# Patient Record
Sex: FEMALE | Race: WHITE | NOT HISPANIC OR LATINO | Employment: FULL TIME | URBAN - METROPOLITAN AREA
[De-identification: names, ages, dates, MRNs, and addresses within clinical notes are randomized per-mention and may not be internally consistent; named-entity substitution may affect disease eponyms.]

---

## 2020-03-22 ENCOUNTER — APPOINTMENT (OUTPATIENT)
Dept: CT IMAGING | Facility: CLINIC | Age: 30
End: 2020-03-22
Attending: EMERGENCY MEDICINE
Payer: COMMERCIAL

## 2020-03-22 ENCOUNTER — HOSPITAL ENCOUNTER (EMERGENCY)
Facility: CLINIC | Age: 30
Discharge: HOME | End: 2020-03-22
Attending: EMERGENCY MEDICINE | Admitting: EMERGENCY MEDICINE
Payer: COMMERCIAL

## 2020-03-22 VITALS
OXYGEN SATURATION: 98 % | HEIGHT: 68 IN | TEMPERATURE: 98 F | RESPIRATION RATE: 20 BRPM | HEART RATE: 68 BPM | WEIGHT: 195 LBS | SYSTOLIC BLOOD PRESSURE: 127 MMHG | DIASTOLIC BLOOD PRESSURE: 80 MMHG | BODY MASS INDEX: 29.55 KG/M2

## 2020-03-22 DIAGNOSIS — N20.1 RIGHT URETERAL STONE: ICD-10-CM

## 2020-03-22 DIAGNOSIS — N13.4 HYDROURETER ON RIGHT: ICD-10-CM

## 2020-03-22 LAB
ALBUMIN UR-MCNC: NEGATIVE MG/DL
ANION GAP SERPL CALCULATED.3IONS-SCNC: 7 MMOL/L (ref 3–14)
APPEARANCE UR: CLEAR
BACTERIA #/AREA URNS HPF: ABNORMAL /HPF
BASOPHILS # BLD AUTO: 0.1 10E9/L (ref 0–0.2)
BASOPHILS NFR BLD AUTO: 0.3 %
BILIRUB UR QL STRIP: NEGATIVE
BUN SERPL-MCNC: 18 MG/DL (ref 7–30)
CALCIUM SERPL-MCNC: 10 MG/DL (ref 8.5–10.1)
CHLORIDE SERPL-SCNC: 105 MMOL/L (ref 94–109)
CO2 SERPL-SCNC: 25 MMOL/L (ref 20–32)
COLOR UR AUTO: ABNORMAL
CREAT SERPL-MCNC: 0.77 MG/DL (ref 0.52–1.04)
DIFFERENTIAL METHOD BLD: ABNORMAL
EOSINOPHIL # BLD AUTO: 0 10E9/L (ref 0–0.7)
EOSINOPHIL NFR BLD AUTO: 0.1 %
ERYTHROCYTE [DISTWIDTH] IN BLOOD BY AUTOMATED COUNT: 11.5 % (ref 10–15)
GFR SERPL CREATININE-BSD FRML MDRD: >90 ML/MIN/{1.73_M2}
GLUCOSE SERPL-MCNC: 102 MG/DL (ref 70–99)
GLUCOSE UR STRIP-MCNC: NEGATIVE MG/DL
HCG SERPL QL: NEGATIVE
HCT VFR BLD AUTO: 42.7 % (ref 35–47)
HGB BLD-MCNC: 14.8 G/DL (ref 11.7–15.7)
HGB UR QL STRIP: ABNORMAL
IMM GRANULOCYTES # BLD: 0.1 10E9/L (ref 0–0.4)
IMM GRANULOCYTES NFR BLD: 0.3 %
KETONES UR STRIP-MCNC: 5 MG/DL
LEUKOCYTE ESTERASE UR QL STRIP: NEGATIVE
LYMPHOCYTES # BLD AUTO: 1.2 10E9/L (ref 0.8–5.3)
LYMPHOCYTES NFR BLD AUTO: 7.5 %
MCH RBC QN AUTO: 31.1 PG (ref 26.5–33)
MCHC RBC AUTO-ENTMCNC: 34.7 G/DL (ref 31.5–36.5)
MCV RBC AUTO: 90 FL (ref 78–100)
MONOCYTES # BLD AUTO: 0.6 10E9/L (ref 0–1.3)
MONOCYTES NFR BLD AUTO: 4 %
MUCOUS THREADS #/AREA URNS LPF: PRESENT /LPF
NEUTROPHILS # BLD AUTO: 13.6 10E9/L (ref 1.6–8.3)
NEUTROPHILS NFR BLD AUTO: 87.8 %
NITRATE UR QL: NEGATIVE
NRBC # BLD AUTO: 0 10*3/UL
NRBC BLD AUTO-RTO: 0 /100
PH UR STRIP: 6 PH (ref 5–7)
PLATELET # BLD AUTO: 378 10E9/L (ref 150–450)
POTASSIUM SERPL-SCNC: 4 MMOL/L (ref 3.4–5.3)
RBC # BLD AUTO: 4.76 10E12/L (ref 3.8–5.2)
RBC #/AREA URNS AUTO: 9 /HPF (ref 0–2)
SODIUM SERPL-SCNC: 137 MMOL/L (ref 133–144)
SOURCE: ABNORMAL
SP GR UR STRIP: 1 (ref 1–1.03)
SQUAMOUS #/AREA URNS AUTO: 1 /HPF (ref 0–1)
UROBILINOGEN UR STRIP-MCNC: 0 MG/DL (ref 0–2)
WBC # BLD AUTO: 15.4 10E9/L (ref 4–11)
WBC #/AREA URNS AUTO: 1 /HPF (ref 0–5)

## 2020-03-22 PROCEDURE — 74176 CT ABD & PELVIS W/O CONTRAST: CPT

## 2020-03-22 PROCEDURE — 25800030 ZZH RX IP 258 OP 636: Performed by: EMERGENCY MEDICINE

## 2020-03-22 PROCEDURE — 84703 CHORIONIC GONADOTROPIN ASSAY: CPT | Performed by: EMERGENCY MEDICINE

## 2020-03-22 PROCEDURE — 96376 TX/PRO/DX INJ SAME DRUG ADON: CPT | Performed by: EMERGENCY MEDICINE

## 2020-03-22 PROCEDURE — 25000132 ZZH RX MED GY IP 250 OP 250 PS 637: Performed by: EMERGENCY MEDICINE

## 2020-03-22 PROCEDURE — 96375 TX/PRO/DX INJ NEW DRUG ADDON: CPT | Performed by: EMERGENCY MEDICINE

## 2020-03-22 PROCEDURE — 85025 COMPLETE CBC W/AUTO DIFF WBC: CPT | Performed by: EMERGENCY MEDICINE

## 2020-03-22 PROCEDURE — 80048 BASIC METABOLIC PNL TOTAL CA: CPT | Performed by: EMERGENCY MEDICINE

## 2020-03-22 PROCEDURE — 25000128 H RX IP 250 OP 636: Performed by: EMERGENCY MEDICINE

## 2020-03-22 PROCEDURE — 96361 HYDRATE IV INFUSION ADD-ON: CPT | Performed by: EMERGENCY MEDICINE

## 2020-03-22 PROCEDURE — 96374 THER/PROPH/DIAG INJ IV PUSH: CPT | Performed by: EMERGENCY MEDICINE

## 2020-03-22 PROCEDURE — 81001 URINALYSIS AUTO W/SCOPE: CPT | Performed by: EMERGENCY MEDICINE

## 2020-03-22 PROCEDURE — 99285 EMERGENCY DEPT VISIT HI MDM: CPT | Mod: Z6 | Performed by: EMERGENCY MEDICINE

## 2020-03-22 PROCEDURE — 99285 EMERGENCY DEPT VISIT HI MDM: CPT | Mod: 25 | Performed by: EMERGENCY MEDICINE

## 2020-03-22 RX ORDER — OXYCODONE HYDROCHLORIDE 5 MG/1
5 TABLET ORAL EVERY 6 HOURS PRN
Status: DISCONTINUED | OUTPATIENT
Start: 2020-03-22 | End: 2020-03-22 | Stop reason: HOSPADM

## 2020-03-22 RX ORDER — OXYCODONE HYDROCHLORIDE 5 MG/1
10 TABLET ORAL ONCE
Status: COMPLETED | OUTPATIENT
Start: 2020-03-22 | End: 2020-03-22

## 2020-03-22 RX ORDER — OXYCODONE HYDROCHLORIDE 5 MG/1
5 TABLET ORAL EVERY 6 HOURS PRN
Qty: 2 TABLET | Refills: 0 | Status: SHIPPED | OUTPATIENT
Start: 2020-03-22 | End: 2024-09-19

## 2020-03-22 RX ORDER — KETOROLAC TROMETHAMINE 15 MG/ML
15 INJECTION, SOLUTION INTRAMUSCULAR; INTRAVENOUS ONCE
Status: COMPLETED | OUTPATIENT
Start: 2020-03-22 | End: 2020-03-22

## 2020-03-22 RX ORDER — ONDANSETRON 4 MG/1
4 TABLET, ORALLY DISINTEGRATING ORAL EVERY 8 HOURS PRN
Qty: 10 TABLET | Refills: 0 | Status: SHIPPED | OUTPATIENT
Start: 2020-03-22

## 2020-03-22 RX ORDER — OXYCODONE HYDROCHLORIDE 5 MG/1
5 TABLET ORAL EVERY 6 HOURS PRN
Qty: 8 TABLET | Refills: 0 | Status: SHIPPED | OUTPATIENT
Start: 2020-03-22 | End: 2024-09-19

## 2020-03-22 RX ORDER — HYDROMORPHONE HYDROCHLORIDE 1 MG/ML
0.5 INJECTION, SOLUTION INTRAMUSCULAR; INTRAVENOUS; SUBCUTANEOUS
Status: COMPLETED | OUTPATIENT
Start: 2020-03-22 | End: 2020-03-22

## 2020-03-22 RX ADMIN — KETOROLAC TROMETHAMINE 15 MG: 15 INJECTION, SOLUTION INTRAMUSCULAR; INTRAVENOUS at 01:01

## 2020-03-22 RX ADMIN — OXYCODONE HYDROCHLORIDE 10 MG: 5 TABLET ORAL at 02:19

## 2020-03-22 RX ADMIN — HYDROMORPHONE HYDROCHLORIDE 0.5 MG: 1 INJECTION, SOLUTION INTRAMUSCULAR; INTRAVENOUS; SUBCUTANEOUS at 01:02

## 2020-03-22 RX ADMIN — HYDROMORPHONE HYDROCHLORIDE 0.5 MG: 1 INJECTION, SOLUTION INTRAMUSCULAR; INTRAVENOUS; SUBCUTANEOUS at 01:18

## 2020-03-22 RX ADMIN — SODIUM CHLORIDE, POTASSIUM CHLORIDE, SODIUM LACTATE AND CALCIUM CHLORIDE 1000 ML: 600; 310; 30; 20 INJECTION, SOLUTION INTRAVENOUS at 01:02

## 2020-03-22 RX ADMIN — HYDROMORPHONE HYDROCHLORIDE 0.5 MG: 1 INJECTION, SOLUTION INTRAMUSCULAR; INTRAVENOUS; SUBCUTANEOUS at 01:38

## 2020-03-22 ASSESSMENT — MIFFLIN-ST. JEOR: SCORE: 1648.01

## 2020-03-22 NOTE — ED AVS SNAPSHOT
[facility] Emergency Department  [address] [address]  Phone:  [phone]  Fax:  [phone]                                    Sabiha Ramos   MRN: [id number]    Department:  [facility] Emergency Department   Date of Visit:  3/22/2020           After Visit Summary Signature Page    I have received my discharge instructions, and my questions have been answered. I have discussed any challenges I see with this plan with the nurse or doctor.    ..........................................................................................................................................  Patient/Patient Representative Signature      ..........................................................................................................................................  Patient Representative Print Name and Relationship to Patient    ..................................................               ................................................  Date                                   Time    ..........................................................................................................................................  Reviewed by Signature/Title    ...................................................              ..............................................  Date                                               Time          22EPIC Rev 08/18

## 2020-03-22 NOTE — DISCHARGE INSTRUCTIONS
Drink plenty fluids.  General emergency department for fevers, repeated vomiting, worsening symptoms, or other concerns.  Otherwise follow-up in clinic if not better in the next week.    Use ibuprofen 400 mg and acetaminophen 650 mg every 6 hours for your symptoms.  Use oxycodone as needed for pain that is not controlled by the prior two medications.    Oxycodone is an addictive opioid medication, please only take it when the pain is more than can be controlled by acetaminophen or ibuprofen alone. It will also make you lightheaded, nauseated, and constipated.  Do not drive, operate heavy machinery, or take care of young children while taking this medication. Do not mix it with other medications or drugs that will make you sleepy, such as alcohol.     Repeated studies have shown that the longer you use opioid pain medications, the longer it is until you return to normal function. It is our recommendation that you taper off opioids as quickly as possible with the goal of returning to normal function or near normal function. Long term use of opioids quickly results in growing tolerance to the medication (less of the benefits) and increased dependence (more of the bad side effects).     Pain is very difficult to treat and we can very rarely take away pain completely. If you are having difficulty with pain over several weeks after an injury, you may need to start different medications and therapies (such as physical therapy, graded exercise, massage, and acupuncture). Please talk about this with your regular doctor.     If you are interested in seeking free, confidential treatment referral and information service for individuals and families facing mental health and/or substance use disorders please call [phone]-[employer] ([phone])

## 2020-03-22 NOTE — ED NOTES
Pt arrives in severe pain of the rt flank area radiating to rt [place]  [place] for the last 4 hrs. She stats that she thinks it is a kidney stone as she has had them before but thi is the worst she has ever had, She states that she is unable to give a urine sample. She denies blood in urine but  stated possibly pink on bathroom tissue. She states she has felt like she had a fever but did not take a temp. She took a percocet prior to arrival. She has a Hx gastric surgery for weight loss.

## 2020-03-22 NOTE — ED PROVIDER NOTES
"  History     Chief Complaint   Patient presents with     Flank Pain     rt side  stareted 5 hrs ago/ migrated to bladder.     HPI  Sabiha Ramos is a 29 year old female who presents for right flank pain.  Symptoms started about 5 hours prior to arrival, pain is described as sharp and cramping, rated as severe, radiates to the suprapubic region.  She has nausea but no vomiting.  No diarrhea.  She denies dysuria or urinary frequency.  No vaginal bleeding or discharge.  No rash.  She is tried taking acetaminophen and oxycodone without improvement.  She has had a prior gastric bypass surgery.  She also has had prior kidney stones and says this feels similar but is much worse than before.  No fever, chills, difficulty breathing.    Allergies:  Allergies   Allergen Reactions     Augmentin      Cephalosporins      Diamox [Acetazolamide]      Penicillins        Problem List:    There are no active problems to display for this patient.       Past Medical History:    No past medical history on file.    Past Surgical History:    No past surgical history on file.    Family History:    No family history on file.    Social History:  Marital Status:    Social History     Tobacco Use     Smoking status: Not on file   Substance Use Topics     Alcohol use: Not on file     Drug use: Not on file        Medications:    ondansetron (ZOFRAN ODT) 4 MG ODT tab  oxyCODONE (ROXICODONE) 5 MG tablet  oxyCODONE (ROXICODONE) 5 MG tablet          Review of Systems  Pertinent positives and negatives listed in the HPI, all other systems reviewed and are negative.    Physical Exam   BP: (!) 179/110  Pulse: 74  Heart Rate: 85  Temp: 98  F (36.7  C)  Resp: 20  Height: 172.7 cm (5' 8\")  Weight: 88.5 kg (195 lb)  SpO2: 97 %      Physical Exam  Vitals signs and nursing note reviewed.   Constitutional:       General: She is in acute distress.      Appearance: She is well-developed. She is not diaphoretic.   HENT:      Head: Normocephalic and atraumatic. "      Right Ear: External ear normal.      Left Ear: External ear normal.      Nose: Nose normal.   Eyes:      General: No scleral icterus.     Conjunctiva/sclera: Conjunctivae normal.   Neck:      Musculoskeletal: Normal range of motion.   Cardiovascular:      Rate and Rhythm: Normal rate and regular rhythm.   Pulmonary:      Effort: Pulmonary effort is normal. No respiratory distress.      Breath sounds: No stridor.   Abdominal:      General: There is no distension.      Palpations: Abdomen is soft.      Tenderness: There is abdominal tenderness. There is right CVA tenderness. There is no guarding or rebound.   Skin:     General: Skin is warm and dry.   Neurological:      Mental Status: She is alert and oriented to person, place, and time.   Psychiatric:         Behavior: Behavior normal.         ED Course        Procedures               Critical Care time:  none               Results for orders placed or performed during the hospital encounter of 03/22/20 (from the past 24 hour(s))   Basic metabolic panel   Result Value Ref Range    Sodium 137 133 - 144 mmol/L    Potassium 4.0 3.4 - 5.3 mmol/L    Chloride 105 94 - 109 mmol/L    Carbon Dioxide 25 20 - 32 mmol/L    Anion Gap 7 3 - 14 mmol/L    Glucose 102 (H) 70 - 99 mg/dL    Urea Nitrogen 18 7 - 30 mg/dL    Creatinine 0.77 0.52 - 1.04 mg/dL    GFR Estimate >90 >60 mL/min/[1.73_m2]    GFR Estimate If Black >90 >60 mL/min/[1.73_m2]    Calcium 10.0 8.5 - 10.1 mg/dL   CBC with platelets differential   Result Value Ref Range    WBC 15.4 (H) 4.0 - 11.0 10e9/L    RBC Count 4.76 3.8 - 5.2 10e12/L    Hemoglobin 14.8 11.7 - 15.7 g/dL    Hematocrit 42.7 35.0 - 47.0 %    MCV 90 78 - 100 fl    MCH 31.1 26.5 - 33.0 pg    MCHC 34.7 31.5 - 36.5 g/dL    RDW 11.5 10.0 - 15.0 %    Platelet Count 378 150 - 450 10e9/L    Diff Method Automated Method     % Neutrophils 87.8 %    % Lymphocytes 7.5 %    % Monocytes 4.0 %    % Eosinophils 0.1 %    % Basophils 0.3 %    % Immature  Granulocytes 0.3 %    Nucleated RBCs 0 0 /100    Absolute Neutrophil 13.6 (H) 1.6 - 8.3 10e9/L    Absolute Lymphocytes 1.2 0.8 - 5.3 10e9/L    Absolute Monocytes 0.6 0.0 - 1.3 10e9/L    Absolute Eosinophils 0.0 0.0 - 0.7 10e9/L    Absolute Basophils 0.1 0.0 - 0.2 10e9/L    Abs Immature Granulocytes 0.1 0 - 0.4 10e9/L    Absolute Nucleated RBC 0.0    HCG qualitative pregnancy (blood)   Result Value Ref Range    HCG Qualitative Serum Negative NEG^Negative   Abd/pelvis CT - no contrast - Stone Protocol    Narrative    EXAM: CT ABDOMEN PELVIS W/O CONTRAST  LOCATION: [facility]  DATE/TIME: 3/22/2020 1:46 AM    INDICATION: Flank pain, recurrent stone disease suspected - right  COMPARISON: None.  TECHNIQUE: CT scan of the abdomen and pelvis was performed without IV contrast. Multiplanar reformats were obtained. Dose reduction techniques were used.  CONTRAST: None.    FINDINGS:   LOWER CHEST: Single small dependent focus of groundglass attenuation in the right lung base, most likely atelectasis.    HEPATOBILIARY: Normal.    PANCREAS: Normal.    SPLEEN: Normal.    ADRENAL GLANDS: Normal.    KIDNEYS/BLADDER: Punctate nonobstructing stone in the left lower kidney. No left-sided hydronephrosis or hydroureter. There is a calcification within the urinary bladder near the left UVJ which appears probably separate from it, and likely in the   bladder, measuring 2 mm, probably a passed stone. Mild to moderate right hydronephrosis and hydroureter is seen, however no visualized urinary tract stone on the right. A calcification in the rightward pelvis on image #220 series 5 is a phlebolith.   Additional phleboliths are also noted.    BOWEL: Status post gastric bypass. No evidence of complication. Normal caliber bowel. No inflammatory change. Normal appendix.    Small umbilical fat-containing hernia noted.    LYMPH NODES: Normal.    VASCULATURE: Unremarkable.    PELVIC ORGANS: IUD within expected position in the uterus.  Normal size uterus and ovaries. No free fluid.    MUSCULOSKELETAL: Normal.      Impression    IMPRESSION:   1.  Mild to moderate right hydronephrosis and hydroureter, without visualized stone in the right ureter. There is a probable 2 mm stone in the urinary bladder, although is located in closer proximity to the left UVJ than right. Nevertheless, findings may   represent a recently passed stone.    2.  There is a nonobstructing punctate stone within the left kidney. No left hydronephrosis or hydroureter.    3.  Small umbilical fat-containing hernia.     UA reflex to Microscopic   Result Value Ref Range    Color Urine Straw     Appearance Urine Clear     Glucose Urine Negative NEG^Negative mg/dL    Bilirubin Urine Negative NEG^Negative    Ketones Urine 5 (A) NEG^Negative mg/dL    Specific Gravity Urine 1.004 1.003 - 1.035    Blood Urine Large (A) NEG^Negative    pH Urine 6.0 5.0 - 7.0 pH    Protein Albumin Urine Negative NEG^Negative mg/dL    Urobilinogen mg/dL 0.0 0.0 - 2.0 mg/dL    Nitrite Urine Negative NEG^Negative    Leukocyte Esterase Urine Negative NEG^Negative    Source Midstream Urine     RBC Urine 9 (H) 0 - 2 /HPF    WBC Urine 1 0 - 5 /HPF    Bacteria Urine Few (A) NEG^Negative /HPF    Squamous Epithelial /HPF Urine 1 0 - 1 /HPF    Mucous Urine Present (A) NEG^Negative /LPF       Medications   oxyCODONE (ROXICODONE) tablet 5 mg (has no administration in time range)   ketorolac (TORADOL) injection 15 mg (15 mg Intravenous Given 3/22/20 0101)   HYDROmorphone (PF) (DILAUDID) injection 0.5 mg (0.5 mg Intravenous Given 3/22/20 0138)   lactated ringers BOLUS 1,000 mL (0 mLs Intravenous Stopped 3/22/20 0220)   oxyCODONE (ROXICODONE) tablet 10 mg (10 mg Oral Given 3/22/20 0219)       Assessments & Plan (with Medical Decision Making)   29-year-old female who presents for right flank pain.  Temperature is 98  F, heart rate 85, SPO2 is 97% on room air.  She is given IV ketorolac, hydromorphone, and fluids for  symptoms.  Urinalysis positive for red blood cells, no signs of infection.  White blood cell count is elevated.  Creatinine is normal.  Urine pregnancy test is negative.  CT of the abdomen/pelvis obtained, images reviewed independently as well as radiology read reviewed, positive for right-sided hydro-nephrosis and hydroureter, there is no definite stones seen but this likely represents right ureter stone.  On recheck the patient is more comfortable and is given oral oxycodone.  She is safe to discharge with instructions to return if she has worsening symptoms or fevers or other concerns, otherwise follow-up in clinic.  The patient is in agreement with this plan.    I have reviewed the nursing notes.    I have reviewed the findings, diagnosis, plan and need for follow up with the patient.       New Prescriptions    ONDANSETRON (ZOFRAN ODT) 4 MG ODT TAB    Take 1 tablet (4 mg) by mouth every 8 hours as needed for nausea    OXYCODONE (ROXICODONE) 5 MG TABLET    Take 1 tablet (5 mg) by mouth every 6 hours as needed for pain    OXYCODONE (ROXICODONE) 5 MG TABLET    Take 1 tablet (5 mg) by mouth every 6 hours as needed for pain       Final diagnoses:   Hydroureter on right   Right ureteral stone       3/22/2020   [facility] EMERGENCY DEPARTMENT     Romario Jimenez MD  03/22/20 0243

## 2021-05-27 ENCOUNTER — RECORDS - HEALTHEAST (OUTPATIENT)
Dept: ADMINISTRATIVE | Facility: CLINIC | Age: 31
End: 2021-05-27

## 2021-05-31 ENCOUNTER — RECORDS - HEALTHEAST (OUTPATIENT)
Dept: ADMINISTRATIVE | Facility: CLINIC | Age: 31
End: 2021-05-31

## 2024-09-19 ENCOUNTER — HOSPITAL ENCOUNTER (EMERGENCY)
Facility: CLINIC | Age: 34
Discharge: HOME | End: 2024-09-19
Payer: COMMERCIAL

## 2024-09-19 ENCOUNTER — APPOINTMENT (OUTPATIENT)
Dept: GENERAL RADIOLOGY | Facility: CLINIC | Age: 34
End: 2024-09-19
Payer: COMMERCIAL

## 2024-09-19 VITALS
OXYGEN SATURATION: 99 % | WEIGHT: 190 LBS | BODY MASS INDEX: 28.14 KG/M2 | DIASTOLIC BLOOD PRESSURE: 80 MMHG | RESPIRATION RATE: 24 BRPM | HEIGHT: 69 IN | SYSTOLIC BLOOD PRESSURE: 125 MMHG | HEART RATE: 67 BPM

## 2024-09-19 DIAGNOSIS — S01.81XA FACIAL LACERATION, INITIAL ENCOUNTER: ICD-10-CM

## 2024-09-19 DIAGNOSIS — S41.131A: ICD-10-CM
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
## 2024-09-19 DIAGNOSIS — S81.832A: ICD-10-CM

## 2024-09-19 PROCEDURE — 96375 TX/PRO/DX INJ NEW DRUG ADDON: CPT

## 2024-09-19 PROCEDURE — 250N000013 HC RX MED GY IP 250 OP 250 PS 637

## 2024-09-19 PROCEDURE — 90675 RABIES VACCINE IM: CPT

## 2024-09-19 PROCEDURE — 96372 THER/PROPH/DIAG INJ SC/IM: CPT

## 2024-09-19 PROCEDURE — 90471 IMMUNIZATION ADMIN: CPT

## 2024-09-19 PROCEDURE — 99284 EMERGENCY DEPT VISIT MOD MDM: CPT

## 2024-09-19 PROCEDURE — 73090 X-RAY EXAM OF FOREARM: CPT | Mod: RT

## 2024-09-19 PROCEDURE — 90375 RABIES IG IM/SC: CPT

## 2024-09-19 PROCEDURE — 250N000011 HC RX IP 250 OP 636

## 2024-09-19 PROCEDURE — 99284 EMERGENCY DEPT VISIT MOD MDM: CPT | Mod: 25

## 2024-09-19 PROCEDURE — 96374 THER/PROPH/DIAG INJ IV PUSH: CPT

## 2024-09-19 PROCEDURE — 96376 TX/PRO/DX INJ SAME DRUG ADON: CPT

## 2024-09-19 RX ORDER — LORAZEPAM 0.5 MG/1
0.5 TABLET ORAL
Status: COMPLETED | OUTPATIENT
Start: 2024-09-19 | End: 2024-09-19

## 2024-09-19 RX ORDER — HYDROMORPHONE HYDROCHLORIDE 1 MG/ML
0.5 INJECTION, SOLUTION INTRAMUSCULAR; INTRAVENOUS; SUBCUTANEOUS
Status: COMPLETED | OUTPATIENT
Start: 2024-09-19 | End: 2024-09-19

## 2024-09-19 RX ORDER — OXYCODONE HYDROCHLORIDE 5 MG/1
5 TABLET ORAL EVERY 6 HOURS PRN
Qty: 6 TABLET | Refills: 0 | Status: SHIPPED | OUTPATIENT
Start: 2024-09-19

## 2024-09-19 RX ORDER — ACETAMINOPHEN 500 MG
1000 TABLET ORAL ONCE
Status: COMPLETED | OUTPATIENT
Start: 2024-09-19 | End: 2024-09-19

## 2024-09-19 RX ORDER — FENTANYL CITRATE 50 UG/ML
25 INJECTION, SOLUTION INTRAMUSCULAR; INTRAVENOUS ONCE
Status: COMPLETED | OUTPATIENT
Start: 2024-09-19 | End: 2024-09-19

## 2024-09-19 RX ORDER — DOXYCYCLINE 100 MG/1
100 CAPSULE ORAL 2 TIMES DAILY
Qty: 28 CAPSULE | Refills: 0 | Status: SHIPPED | OUTPATIENT
Start: 2024-09-19 | End: 2024-10-03

## 2024-09-19 RX ADMIN — HYDROMORPHONE HYDROCHLORIDE 0.5 MG: 1 INJECTION, SOLUTION INTRAMUSCULAR; INTRAVENOUS; SUBCUTANEOUS at 18:49

## 2024-09-19 RX ADMIN — HYDROMORPHONE HYDROCHLORIDE 0.5 MG: 1 INJECTION, SOLUTION INTRAMUSCULAR; INTRAVENOUS; SUBCUTANEOUS at 19:24

## 2024-09-19 RX ADMIN — LORAZEPAM 0.5 MG: 0.5 TABLET ORAL at 18:50

## 2024-09-19 RX ADMIN — HYDROMORPHONE HYDROCHLORIDE 0.5 MG: 1 INJECTION, SOLUTION INTRAMUSCULAR; INTRAVENOUS; SUBCUTANEOUS at 20:50

## 2024-09-19 RX ADMIN — FENTANYL CITRATE 25 MCG: 50 INJECTION INTRAMUSCULAR; INTRAVENOUS at 18:29

## 2024-09-19 RX ADMIN — RABIES IMMUNE GLOBULIN (HUMAN) 1800 UNITS: 300 INJECTION, SOLUTION INFILTRATION; INTRAMUSCULAR at 21:50

## 2024-09-19 RX ADMIN — HYDROMORPHONE HYDROCHLORIDE 1 MG: 1 INJECTION, SOLUTION INTRAMUSCULAR; INTRAVENOUS; SUBCUTANEOUS at 20:05

## 2024-09-19 RX ADMIN — RABIES VACCINE 1 ML: KIT at 21:46

## 2024-09-19 RX ADMIN — ACETAMINOPHEN 1000 MG: 500 TABLET, FILM COATED ORAL at 18:53

## 2024-09-19 ASSESSMENT — COLUMBIA-SUICIDE SEVERITY RATING SCALE - C-SSRS
2. HAVE YOU ACTUALLY HAD ANY THOUGHTS OF KILLING YOURSELF IN THE PAST MONTH?: NO
6. HAVE YOU EVER DONE ANYTHING, STARTED TO DO ANYTHING, OR PREPARED TO DO ANYTHING TO END YOUR LIFE?: NO
1. IN THE PAST MONTH, HAVE YOU WISHED YOU WERE DEAD OR WISHED YOU COULD GO TO SLEEP AND NOT WAKE UP?: NO

## 2024-09-19 ASSESSMENT — ACTIVITIES OF DAILY LIVING (ADL)
ADLS_ACUITY_SCORE: 35

## 2024-09-19 NOTE — ED TRIAGE NOTES
Pt arrived via private car with friend for a dog bite. Pt reported her dog tore up the couch, and then attacked her and bite her nose, back of left thigh, and right forearm/anterior posterior. Site behind left thigh is puncture maryana, right forearm is puncture an bridge of nose is a laceration. Active bleeding noted to nose. Site is cleaned by primary RN. Pt reported pt is updated on vaccinations.      Triage Assessment (Adult)       Row Name 09/19/24 6531          Triage Assessment    Airway WDL WDL        Respiratory WDL    Respiratory WDL WDL        Skin Circulation/Temperature WDL    Skin Circulation/Temperature WDL WDL        Cardiac WDL    Cardiac WDL WDL        Peripheral/Neurovascular WDL    Peripheral Neurovascular WDL WDL        Cognitive/Neuro/Behavioral WDL    Cognitive/Neuro/Behavioral WDL WDL

## 2024-09-19 NOTE — ED NOTES
Pt arrived via triage in , accomp by friend.  Pt is screaming in pain and crying, blood is dripping down pt nose and mouth.  Pt reports she was just attacked by her Pit Bull.  Pt state dog had ripped up all the cousions on her couch and was still standing on it when she got home. Pt yelled at dog to get down, and then approach dog to get him off the couch.  Dog lunged at pt, hitting her in the face first

## 2024-09-20 NOTE — ED PROVIDER NOTES
"[facility]  Emergency Department Visit Note    PATIENT:  Sabiha Ramos     34 year old     female      [id number]    Chief complaint:  Chief Complaint   Patient presents with    Dog Bite          History of present illness:  Patient is a 34 year old female with chronic headache, metabolic syndrome status post sleeve gastrectomy (2014) presenting for evaluation of dog bite.    Injury occurred around 6 PM.  Dog was her own.  Patient came home and saw that the dog had ripped the pillows.  Dog was sitting on the couch.  Patient reports yelling at the job and noticing that he knew he was in trouble.  She went to go grab the pillow and the dog lunged at her.  She thinks his tooth hit her forehead, he then latched onto his right arm with his teeth.  As she was attempting to get out of the house, he subsequently came back and bit her left posterior thigh.    No other injuries noted.  She is reporting severe pain over the right arm primarily.  She is unsure whether the dog was vaccinated for rabies, she adopted the dog from another family who was unable to handle the dog, did not go through formal adoption system, she is unsure whether he was vaccinated.      Review of Systems:  As in HPI above    BP (!) 131/105   Pulse (!) 140   Resp 24   Ht 1.753 m (5' 9\")   Wt 86.2 kg (190 lb)   SpO2 99%   BMI 28.06 kg/m        Physical Exam  Constitutional: Negative hospital bed, alert, oriented, in moderate distress due to pain, tearful, moaning, answering questions appropriately  HEENT: Roughly 2 cm laceration above the bridge of the nose, scant venous oozing, not grossly contaminated.  Small laceration over the left upper lip mucosal surface, does not cross vermilion border.    Neck: no stridor  Cardiovascular: tachycardic and regular rhythm  Pulmonary: breathing comfortably on room air  Abdominal: soft, non-tender, non-distended  Extremities/MSK: Puncture bite marks over the right forearm as below.  " Crepitus present.  Area exquisitely tender.  Does have some mild tenderness on the right upper arm without evident trauma, no crepitus there.  Sensation intact in median, radial, ulnar nerve distribution right hand,  strength limited by pain.  Hand is well-perfused, radial pulse present.  More superficial bite maryana/abrasion over the posterior left thigh.  The right forearm compartments are soft to palpation.     Above: left posterior thigh    Above: right forearm    Above: right forearm  Skin: warm, dry  Neurologic: moves all four extremities spontaneously  Psychiatric: appears anxious and tearful      MDM:  Patient is a 34 year old female with above history presenting for evaluation of dog bite wounds.    Vitals notable for tachycardia likely related to pain and anxiety. Exam notable for scattered areas of puncture wounds, more evident laceration on the face, crepitus of the right forearm.    Planning to evaluate right forearm for retained foreign body.  Left thigh wound is very superficial, no indication for imaging here.  No evidence at this point of compartment syndrome right forearm, suspect her severe pain is likely related to tracking subcutaneous air related to puncture wound.    Due to significant degree of pain planning for fentanyl, hydromorphone, acetaminophen for pain.  Lorazepam for anxiolysis.  Will need thorough irrigation.  Will likely leave extremity wounds open, facial wound is in an important cosmetic location.  I discussed potential risks of worsening infection risk with closure, though will mitigate this with thorough irrigation and prophylactic doxycycline (has numerous medication allergies including to penicillins and cephalosporins).  She is agreeable with closure of the facial laceration but leaving other puncture wounds open.    Discussed rabies immunoglobulin.  She is unsure whether the dog was immunized but would like to proceed with immunization and immunoglobulin.  Tetanus updated  2017, wounds not grossly contaminated, no indication at this point for repeat tetanus.    Disposition likely discharge pending imaging and repair . Remainder of ED course below.    ED COURSE:  ED Course as of 09/19/24 2202   Thu Sep 19, 2024   1950 Radius/Ulna XR, PA & LAT, right  Luminary image independently reviewed, no retained foreign body, no bony injury, though does have crepitus which is likely contributing to pain   2156 Rabies immunoglobulin administered.  Rabies vaccine administered.  Acetaminophen, ibuprofen, oxycodone for pain.  Doxycycline.  Sent with instructions for rabies vaccine schedule 3 days from now (come to ER), 1 week from now and 2 weeks from now to [facility].  ED return precaution discussed at length for signs or symptoms concerning for infection.  Patient and family expressed understanding of and agreement with this.     [facility]    -Laceration Repair    Date/Time: 9/19/2024 9:21 PM    Performed by: Moreno Phillips MD  Authorized by: Moreno Phillips MD    Risks, benefits and alternatives discussed.      ANESTHESIA (see MAR for exact dosages):     Anesthesia method:  Local infiltration    Local anesthetic:  Bupivacaine 0.25% w/o epi  LACERATION DETAILS     Location:  Face    Face location:  Nose    Length (cm):  2    REPAIR TYPE:     Repair type:  Simple    EXPLORATION:     Contaminated: no      TREATMENT:     Area cleansed with:  Saline    Amount of cleaning:  Extensive    Irrigation method:  Pressure wash and syringe    Visualized foreign bodies/material removed: no      SKIN REPAIR     Repair method:  Sutures    Suture size:  6-0    Suture material:  Nylon    Number of sutures:  3    APPROXIMATION     Approximation:  Close    POST-PROCEDURE DETAILS     Dressing:  Antibiotic ointment      PROCEDURE    Patient Tolerance:  Patient tolerated the procedure well with no immediate complications        Encounter Diagnoses:  Final diagnoses:   Dog bite,  initial encounter   Facial laceration, initial encounter   Puncture wound of multiple sites of upper extremity, right, initial encounter   Puncture wound of left lower extremity       Final disposition: discharge    Moreno Phillips MD  9/19/2024  8:01 PM   Emergency Medicine  [facility]      Moreno Phillips MD  09/19/24 2202       Moreno Phillips MD  09/19/24 2205

## 2024-09-20 NOTE — DISCHARGE INSTRUCTIONS
You were seen in the emergency department today for evaluation after dog bites. We did tests including x-rays that showed no damage to the bone, though you do have air in the arm that is likely causing pain.  These wounds will heal with time, keep them clean and dry in the meantime.     To repair the laceration/cut, we placed stitches. These will not dissolve on their own and will need to be removed in 4-6 days. This can be done at any primary care office, urgent care, or emergency department. The laceration may have some mild bleeding tonight until it starts to heal. To assist with healing, you can place antibiotic ointment, such as bacitracin or Neosporin, to help keep the wound moist during healing. Sunscreen over the laceration/cut can help prevent scar formation. Otherwise, keep the area clean as it heals.    To treat this infection, I am sending you with an antibiotic called doxycycline. You should take this as prescribed for the next 14 days. It is important that you take the full 14 days worth of antibiotics even if you start feeling better sooner. The risk of not taking the full course of antibiotics is that the infection may come back, or bacteria may become resistant to that antibiotic.    We also gave you rabies vaccines.  The rabies vaccine schedule has several more doses:  1) 3 days from now (Sunday 9/22)  2) 1 week from now (9/26)  3) 2 weeks from now (10/3)    To have the subsequent rabies vaccines on these days, on Sunday 9/20 to come to the emergency department for your subsequent dose.  You will then go to the [facility] for rabies shots on 9/26 and 10/3.    In the meantime, you can take acetaminophen (Tylenol) or ibuprofen for your pain. You can alternate these every three hours as needed. So, for example, you could take acetaminophen at noon, then ibuprofen at 3pm, then acetaminophen again at 6pm, and so on. Do not take more Tylenol or ibuprofen than the daily maximum dose as indicated on  the bottle.    I am sending you with a prescription for a medication called oxycodone. This is a powerful pain medicine you can take as needed for pain. You should first try acetaminophen (Tylenol) or ibuprofen to help with pain, and only take oxycodone if the first medication does not help. Take this medication only as prescribed on the bottle. Do not drink alcohol, operate heavy machinery (such as a car), or make important life decisions while taking this medication.     Return to the emergency department with new or worsening symptoms that you find concerning.

## 2024-09-22 ENCOUNTER — HOSPITAL ENCOUNTER (EMERGENCY)
Facility: CLINIC | Age: 34
Discharge: HOME | End: 2024-09-22
Admitting: EMERGENCY MEDICINE
Payer: COMMERCIAL

## 2024-09-22 PROCEDURE — 90471 IMMUNIZATION ADMIN: CPT

## 2024-09-22 PROCEDURE — G0463 HOSPITAL OUTPT CLINIC VISIT: HCPCS | Mod: 25 | Performed by: EMERGENCY MEDICINE

## 2024-09-22 PROCEDURE — 250N000011 HC RX IP 250 OP 636

## 2024-09-22 PROCEDURE — 90675 RABIES VACCINE IM: CPT

## 2024-09-22 RX ADMIN — RABIES VACCINE 1 ML: KIT at 14:35

## 2024-10-01 ENCOUNTER — HOSPITAL ENCOUNTER (EMERGENCY)
Facility: CLINIC | Age: 34
Discharge: HOME | End: 2024-10-01
Attending: PHYSICIAN ASSISTANT | Admitting: EMERGENCY MEDICINE
Payer: COMMERCIAL

## 2024-10-01 ENCOUNTER — APPOINTMENT (OUTPATIENT)
Dept: GENERAL RADIOLOGY | Facility: CLINIC | Age: 34
End: 2024-10-01
Attending: PHYSICIAN ASSISTANT
Payer: COMMERCIAL

## 2024-10-01 VITALS
DIASTOLIC BLOOD PRESSURE: 111 MMHG | TEMPERATURE: 97.8 F | OXYGEN SATURATION: 99 % | RESPIRATION RATE: 16 BRPM | SYSTOLIC BLOOD PRESSURE: 163 MMHG | HEART RATE: 84 BPM

## 2024-10-01 DIAGNOSIS — Z48.02 ENCOUNTER FOR REMOVAL OF SUTURES: ICD-10-CM
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
## 2024-10-01 DIAGNOSIS — M79.631 PAIN OF RIGHT FOREARM: ICD-10-CM

## 2024-10-01 DIAGNOSIS — Z23 ENCOUNTER FOR REPEAT ADMINISTRATION OF RABIES VACCINATION: ICD-10-CM

## 2024-10-01 PROCEDURE — 90675 RABIES VACCINE IM: CPT

## 2024-10-01 PROCEDURE — 73090 X-RAY EXAM OF FOREARM: CPT | Mod: RT

## 2024-10-01 PROCEDURE — 99214 OFFICE O/P EST MOD 30 MIN: CPT | Mod: 25 | Performed by: EMERGENCY MEDICINE

## 2024-10-01 PROCEDURE — 250N000011 HC RX IP 250 OP 636

## 2024-10-01 PROCEDURE — 76882 US LMTD JT/FCL EVL NVASC XTR: CPT | Mod: RT | Performed by: EMERGENCY MEDICINE

## 2024-10-01 PROCEDURE — 76882 US LMTD JT/FCL EVL NVASC XTR: CPT | Mod: 26 | Performed by: EMERGENCY MEDICINE

## 2024-10-01 PROCEDURE — G0463 HOSPITAL OUTPT CLINIC VISIT: HCPCS | Mod: 25 | Performed by: EMERGENCY MEDICINE

## 2024-10-01 PROCEDURE — 90471 IMMUNIZATION ADMIN: CPT

## 2024-10-01 RX ORDER — HYDROCODONE BITARTRATE AND ACETAMINOPHEN 5; 325 MG/1; MG/1
1 TABLET ORAL EVERY 6 HOURS PRN
Qty: 8 TABLET | Refills: 0 | Status: SHIPPED | OUTPATIENT
Start: 2024-10-01 | End: 2024-10-04

## 2024-10-01 RX ADMIN — RABIES VACCINE 1 ML: KIT at 12:22

## 2024-10-01 ASSESSMENT — COLUMBIA-SUICIDE SEVERITY RATING SCALE - C-SSRS
1. IN THE PAST MONTH, HAVE YOU WISHED YOU WERE DEAD OR WISHED YOU COULD GO TO SLEEP AND NOT WAKE UP?: NO
2. HAVE YOU ACTUALLY HAD ANY THOUGHTS OF KILLING YOURSELF IN THE PAST MONTH?: NO
6. HAVE YOU EVER DONE ANYTHING, STARTED TO DO ANYTHING, OR PREPARED TO DO ANYTHING TO END YOUR LIFE?: NO

## 2024-10-01 ASSESSMENT — ENCOUNTER SYMPTOMS
CONSTITUTIONAL NEGATIVE: 1
WOUND: 1

## 2024-10-01 ASSESSMENT — ACTIVITIES OF DAILY LIVING (ADL)
ADLS_ACUITY_SCORE: 35
ADLS_ACUITY_SCORE: 35

## 2024-10-01 NOTE — DISCHARGE INSTRUCTIONS
Continue taking your antibiotics as prescribed.  Keep your follow-up in 48 hours with orthopedics.  You may take Norco as needed for breakthrough pain.  Continue to rest and elevate this right arm.    Return to the emergency department should you develop any fevers, significantly worsening arm pain/swelling, or any other symptoms of concern.

## 2024-10-01 NOTE — ED PROVIDER NOTES
History     Chief Complaint   Patient presents with    Arm Pain    Suture Removal    Immunization     HPI  Sabiha Bass is a 34 year old female who presents to Urgent Care with complaints of persistent right forearm pain since a pit bull dog bite injury on 9/19/2024.  Patient was attacked by her pitbull dog and sustained bites to her face and puncture wounds to the right forearm.  Patient had x-rays of her right forearm at that time which showed subcutaneous gas in the mid forearm.  Patient complains of continued right arm pain especially with movement.  She states she feels like something is clicking and very painful in the right forearm with movement of her right wrist.  She continues to be on 2-week course of Doxycycline (Augmentin allergy).  Patient went to an outside urgent care last week for the right forearm pain and was prescribed Prednisone for this; her last dose of this is today.  Patient has an appointment with [facility] in 2 days as well.  Denies fevers or chills.      Patient is also requesting suture removal from the wound on her face.  This wound has been healing well and without signs of infection.    She is also due for her third rabies vaccination today.    I reviewed past medical records and pertinent information is copied below:    EXAM: XR FOREARM RIGHT 2 VIEWS  LOCATION: [facility]  DATE: 9/19/2024     INDICATION: 34F, dog bite, +crepitus and puncture wounds  COMPARISON: None.                                                                      IMPRESSION: No fracture or foreign body. Moderate amount of subcutaneous gas in the dorsal/radial aspect of the midportion of the forearm.      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 07/16/2014     Priority: Medium    Obesity, Class III, BMI 40-49.9 (morbid obesity) (H) 06/25/2014     Priority: Medium    Pseudotumor cerebri 11/27/2013     Priority: Medium    Chronic daily headache 11/27/2013     Priority: Medium        Past Medical History:    Past Medical History:   Diagnosis Date    Chronic headache     Hypertension     Nephrolithiasis     Periodic limb movement     Pseudotumor cerebri        Past Surgical History:    Past Surgical History:   Procedure Laterality Date    LAPAROSCOPIC GASTRIC SLEEVE  6/25/2014    Procedure: LAPAROSCOPIC GASTRIC SLEEVE;  Surgeon: Noah Jaeger MD;  Location: [facility] OR    LAPAROSCOPIC HERNIORRHAPHY HIATAL  6/25/2014    Procedure: LAPAROSCOPIC HERNIORRHAPHY HIATAL;  Surgeon: Noah Jaeger MD;  Location: [facility] OR    wisdom teeth      at age 18       Family History:    Family History   Problem Relation Age of Onset    Neurologic Disorder Mother         migraine    Neurologic Disorder Mother         multiple sclerosis    Neurologic Disorder Maternal Grandfather         migraine    Neurologic Disorder Maternal Aunt         migraine    Cardiovascular Paternal Grandfather     Cardiovascular Maternal Grandfather     Cardiovascular Father        Social History:  Marital Status:  Single [1]  Social History     Tobacco Use    Smoking status: Former     Current packs/day: 0.00     Types: Cigarettes     Quit date: 3/19/2014     Years since quitting: 10.5   Substance Use Topics    Alcohol use: No    Drug use: Yes     Comment: marijuna use, everyday--quit in April        Medications:    HYDROcodone-acetaminophen (NORCO) 5-325 MG tablet  doxycycline hyclate (VIBRAMYCIN) 100 MG capsule  Multiple Vitamins-Minerals (MULTIVITAMIN OR)  ondansetron (ZOFRAN ODT) 4 MG ODT tab  oxyCODONE (ROXICODONE) 5 MG  tablet          Review of Systems   Constitutional: Negative.    Musculoskeletal:         Right forearm pain   Skin:  Positive for wound.   All other systems reviewed and are negative.      Physical Exam   BP: (!) 163/111  Pulse: 84  Temp: 97.8  F (36.6  C)  Resp: 16  SpO2: 99 %      Physical Exam  Constitutional:       General: She is not in acute distress.     Appearance: Normal appearance. She is not ill-appearing, toxic-appearing or diaphoretic.   HENT:      Head: Normocephalic and atraumatic.   Eyes:      Conjunctiva/sclera: Conjunctivae normal.   Cardiovascular:      Pulses: Normal pulses.   Pulmonary:      Effort: Pulmonary effort is normal.   Musculoskeletal:      Cervical back: Neck supple.      Comments: Healing puncture wounds to dorsal right forearm without surrounding erythema, warmth, or purulent drainage.  There is surrounding swelling still present around the puncture sites.  No diffuse swelling or edema into the right wrist or remainder of the forearm.  Patient has tenderness to palpation throughout the right mid forearm surrounding these puncture wounds.  Forearm compartments are soft.  Patient has increased pain in the right forearm with hyperextension of the right wrist and radial deviation of the wrist.  Able to fully extend and flex the right fingers but has pain in the forearm with doing so.  Pulses intact.  Sensation and strength intact.  Healing ecchymosis present to volar aspect of right forearm as well as surrounding puncture wounds to right dorsal forearm.    See below images of patient's exam:   Skin:     General: Skin is warm and dry.      Capillary Refill: Capillary refill takes less than 2 seconds.   Neurological:      General: No focal deficit present.      Mental Status: She is alert.      Sensory: Sensation is intact.      Motor: Motor function is intact.                 [facility]    Suture Removal    Date/Time: 10/1/2024 1:12  PM    Performed by: Sola Hebert PA-C  Authorized by: Sola Hebert PA-C    Risks, benefits and alternatives discussed.      LOCATION     Location:  Upper extremity    Upper extremity location:  Arm    Arm location:  R lower arm    PROCEDURE DETAILS     Wound appearance:  No signs of infection    Number of sutures removed:  3    POST-PROCEDURE DETAILS     Post-removal:  No dressing applied      PROCEDURE    Patient Tolerance:  Patient tolerated the procedure well with no immediate complications      Results for orders placed during the hospital encounter of 10/01/24    POC US SOFT TISSUE    Impression  [facility] Procedure Note    Limited Bedside ED Ultrasound of Soft Tissue:    PROCEDURE: PERFORMED BY: Dr. Hugo Smith MD  INDICATIONS/SYMPTOM: Right forearm pain and discomfort with passive stretch and report of tingling in the right index finger and right pinky after dog bite on 9/19/2024  PROBE: High frequency linear probe  BODY LOCATION: Soft tissue located on right forearm    FINDINGS: Cobblestoning of soft tissue: absent  Hypoechoic fluid (ie abscess) identified: absent    INTERPRETATION:  The soft tissue and muscle layers were evaluated.  Findings indicate no convincing findings to suggest cellulitis, necrotizing soft tissue infection, abscess or myositis allowing for technique.    IMAGE DOCUMENTATION: Images were archived to PACs system.           Results for orders placed or performed during the hospital encounter of 10/01/24 (from the past 24 hour(s))   POC US SOFT TISSUE    Impression    [facility] Procedure Note     Limited Bedside ED Ultrasound of Soft Tissue:    PROCEDURE: PERFORMED BY: Dr. Hugo Smith MD  INDICATIONS/SYMPTOM: Right forearm pain and discomfort with passive stretch and report of tingling in the right index finger and right pinky after dog bite on 9/19/2024  PROBE: High frequency linear probe  BODY LOCATION: Soft tissue located on right  forearm     FINDINGS: Cobblestoning of soft tissue: absent   Hypoechoic fluid (ie abscess) identified: absent      INTERPRETATION:  The soft tissue and muscle layers were evaluated.      Findings indicate no convincing findings to suggest cellulitis, necrotizing soft tissue infection, abscess or myositis allowing for technique.     IMAGE DOCUMENTATION: Images were archived to PACs system.      Radius/Ulna XR, PA & LAT, right    Narrative    XR FOREARM RIGHT 2 VIEWS   10/1/2024 1:20 PM     HISTORY: persistent right forearm pain following dog bite/puncture  wounds) 1.5 weeks ago  COMPARISON: 9/19/2024       Impression    IMPRESSION:     Negative for acute right forearm fracture or dislocation. No  radiographic evidence for osteomyelitis. There is some soft tissue  swelling over the dorsal aspect of the right forearm which appears  improved compared to prior. No subcutaneous emphysema is identified  radiographically.    DAVID BOLTON MD         SYSTEM ID:  [id number]       Medications   rabies vaccine, PCEC (chick embryo derived) (RABAVERT) injection 1 mL (1 mL Intramuscular $Given 10/1/24 1222)       Assessments & Plan (with Medical Decision Making)     Pt is a 34 year old female who presents to Urgent Care with complaints of persistent right forearm pain since a pit bull dog bite injury on 9/19/2024.  Patient was attacked by her pitbull dog and sustained bites to her face and puncture wounds to the right forearm.  Patient had x-rays of her right forearm at that time which showed subcutaneous gas in the mid forearm.  Patient complains of continued right arm pain especially with movement.  She states she feels like something is clicking and very painful in the right forearm with movement of her right wrist.  She continues to be on 2-week course of Doxycycline (Augmentin allergy).  Patient went to an outside urgent care last week for the right forearm pain and was prescribed Prednisone for this; her last dose of  this is today.  Patient has an appointment with [facility] in 2 days as well.  Denies fevers or chills.      Pt is afebrile on arrival.  Exam as above.  Repeat x-rays of right forearm were obtained today and show improving soft tissue swelling over the dorsal aspect of the right forearm compared to prior.  No subcutaneous emphysema identified.  No foreign body, fracture, or evidence of osteomyelitis.  Bedside ultrasound of the soft tissue of the right forearm was completed by Dr. Smith and were negative for evidence of cellulitis or abscess.  Discussed results with patient.  Patient does not have any evidence of cellulitis on exam.  She continues to be on a 2-week course of Doxycycline.  Considered compartment syndrome however despite having pain with palpation around the puncture wound sites, her forearm compartments are soft.  No paresthesias or edema of the arm.  Pulses intact.  Patient was instructed to continue taking antibiotics as prescribed.  Also instructed to keep her follow-up with orthopedics in 48 hours.  Encouraged additional symptomatic treatments at home.      Sutures from patient's face were also removed today.  No evidence of infection and wound appears well-healed.    Nursing also administered her third rabies vaccination today.    Return precautions were reviewed.  Hand-outs were provided.    Patient was instructed to follow-up with orthopedics as scheduled in 48 hours for continued care and management.  She is to return to the ED for persistent and/or worsening symptoms.  Patient expressed understanding of the diagnosis and plan and was discharged home in good condition.    I have reviewed the nursing notes.    I have reviewed the findings, diagnosis, plan and need for follow up with the patient.      New Prescriptions    HYDROCODONE-ACETAMINOPHEN (NORCO) 5-325 MG TABLET    Take 1 tablet by mouth every 6 hours as needed for severe pain.       Final diagnoses:   Pain of right forearm    Dog bite, subsequent encounter   Encounter for removal of sutures   Encounter for repeat administration of rabies vaccination       10/1/2024   [facility] EMERGENCY DEPT      Disclaimer:  This note consists of symbols derived from keyboarding, dictation and/or voice recognition software.  As a result, there may be errors in the script that have gone undetected.  Please consider this when interpreting information found in this chart.     Sola Hebert PA-C  10/01/24 1574

## 2024-10-01 NOTE — ED TRIAGE NOTES
Still having intense RT arm pain that makes a popping sound when using it.  Tracy Craig MA            
no

## 2024-10-01 NOTE — ED PROVIDER NOTES
--    ED Attending Physician Attestation    I Hugo Smith MD, cared for this patient with the Advanced Practice Provider (LAYLA). I personally provided a substantive portion of the care for this patient, including approving the care plan for the number and complexity of problems addressed and taking responsibility related to the risk of complications and/or morbidity or mortality of patient management. Please see the LAYLA's documentation for full details. Urgent care course reviewed with TEZ Hebert PA-C    Summary of HPI, PE, ED Course   Patient is a 34 year old female evaluated in the emergency department for for follow-up assessment from dog bite with crush injury and puncture wounds with bite from a pet pitbull on 9/19/24.  x-ray lesion during that assessment on 9/19/2024 revealed some subcutaneous gas in the dorsal radial aspect of the mid forearm.  Patient is currently on oral doxycycline which was prescribed a 14-day course.  She will presented to urgent care today due to pain with passive stretch.  There is no warmth or redness.  See photo images captured by TEZ Hebert PA-C  in the physical exam and medical record  To ensure patient's pain and discomfort was not related to an evolving soft tissue process repeat x-ray imaging was obtained.  X-ray imaging was reviewed independently and the radiology report is also reviewed. Interval reduction in subcutaneous emphysema from recent comparison exam on 9/19/2024.  See details online radiology report    Bedside POCUS soft tissue ultrasound exam completed with no evidence of cellulitis or necrotizing infection or abscess or convincing evidence of myositis when compared to the unaffected. See images captured in PACS.  After reviewing care goals and options including option to proceed with CT of the right forearm with contrast to exclude any soft tissue process that could have been missed with x-ray and POCUS ultrasound elected to pursue further workup with  pending appointment with follow-up with orthopedics in 2 days.  She was encouraged to complete the 3 days of doxycycline left and that she is continue keep her arm elevated splinted and supported.  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  Reviewed new concerns including paresthesias that progressed to involve the fingers in her right hand, arm weakness or any new concerns    ED Vitals:  Vitals:    10/01/24 1216 10/01/24 1223   BP:  (!) 163/111   Pulse: 84    Resp: 16    Temp: 97.8  F (36.6  C)    SpO2: 99%       ED labs and imaging:  Results for orders placed or performed during the hospital encounter of 10/01/24   POC US SOFT TISSUE     Status: None    Impression    [facility] Procedure Note     Limited Bedside ED Ultrasound of Soft Tissue:    PROCEDURE: PERFORMED BY: Dr. Hugo Smith MD  INDICATIONS/SYMPTOM: Right forearm pain and discomfort with passive stretch and report of tingling in the right index finger and right pinky after dog bite on 9/19/2024  PROBE: High frequency linear probe  BODY LOCATION: Soft tissue located on right forearm     FINDINGS: Cobblestoning of soft tissue: absent   Hypoechoic fluid (ie abscess) identified: absent      INTERPRETATION:  The soft tissue and muscle layers were evaluated.      Findings indicate no convincing findings to suggest cellulitis, necrotizing soft tissue infection, abscess or myositis allowing for technique.     IMAGE DOCUMENTATION: Images were archived to PACs system.      Radius/Ulna XR, PA & LAT, right     Status: None    Narrative    XR FOREARM RIGHT 2 VIEWS   10/1/2024 1:20 PM     HISTORY: persistent right forearm pain following dog bite/puncture  wounds) 1.5 weeks ago  COMPARISON: 9/19/2024       Impression    IMPRESSION:     Negative for acute right forearm fracture or dislocation. No  radiographic evidence for osteomyelitis. There is some soft tissue  swelling over the dorsal aspect of the right forearm  which appears  improved compared to prior. No subcutaneous emphysema is identified  radiographically.    DAVID BOLTON MD         SYSTEM ID:  [id number]          After the completion of care in the emergency department, the patient was discharged.      Hugo Smith MD  Emergency Medicine       Hugo Smith MD  10/01/24 1400

## 2024-12-20 ENCOUNTER — LAB REQUISITION (OUTPATIENT)
Dept: LAB | Facility: CLINIC | Age: 34
End: 2024-12-20

## 2024-12-20 DIAGNOSIS — Z36.9 ENCOUNTER FOR ANTENATAL SCREENING, UNSPECIFIED: ICD-10-CM

## 2024-12-20 PROCEDURE — 87086 URINE CULTURE/COLONY COUNT: CPT | Performed by: NURSE PRACTITIONER

## 2024-12-22 LAB — BACTERIA UR CULT: NO GROWTH

## 2025-01-17 ENCOUNTER — LAB REQUISITION (OUTPATIENT)
Dept: LAB | Facility: CLINIC | Age: 35
End: 2025-01-17
Payer: MEDICAID

## 2025-01-17 DIAGNOSIS — Z3A.12 12 WEEKS GESTATION OF PREGNANCY: ICD-10-CM

## 2025-01-17 LAB
BASOPHILS # BLD AUTO: 0 10E3/UL (ref 0–0.2)
BASOPHILS NFR BLD AUTO: 1 %
EOSINOPHIL # BLD AUTO: 0.1 10E3/UL (ref 0–0.7)
EOSINOPHIL NFR BLD AUTO: 2 %
ERYTHROCYTE [DISTWIDTH] IN BLOOD BY AUTOMATED COUNT: 11.9 % (ref 10–15)
EST. AVERAGE GLUCOSE BLD GHB EST-MCNC: 100 MG/DL
HBA1C MFR BLD: 5.1 %
HCT VFR BLD AUTO: 38.5 % (ref 35–47)
HGB BLD-MCNC: 13.3 G/DL (ref 11.7–15.7)
HIV 1+2 AB+HIV1 P24 AG SERPL QL IA: NONREACTIVE
IMM GRANULOCYTES # BLD: 0 10E3/UL
IMM GRANULOCYTES NFR BLD: 0 %
LYMPHOCYTES # BLD AUTO: 1.3 10E3/UL (ref 0.8–5.3)
LYMPHOCYTES NFR BLD AUTO: 21 %
MCH RBC QN AUTO: 31.4 PG (ref 26.5–33)
MCHC RBC AUTO-ENTMCNC: 34.5 G/DL (ref 31.5–36.5)
MCV RBC AUTO: 91 FL (ref 78–100)
MONOCYTES # BLD AUTO: 0.5 10E3/UL (ref 0–1.3)
MONOCYTES NFR BLD AUTO: 8 %
NEUTROPHILS # BLD AUTO: 4.3 10E3/UL (ref 1.6–8.3)
NEUTROPHILS NFR BLD AUTO: 69 %
NRBC # BLD AUTO: 0 10E3/UL
NRBC BLD AUTO-RTO: 0 /100
PLATELET # BLD AUTO: 312 10E3/UL (ref 150–450)
RBC # BLD AUTO: 4.24 10E6/UL (ref 3.8–5.2)
WBC # BLD AUTO: 6.3 10E3/UL (ref 4–11)

## 2025-01-17 PROCEDURE — 86803 HEPATITIS C AB TEST: CPT | Mod: ORL | Performed by: OBSTETRICS & GYNECOLOGY

## 2025-01-17 PROCEDURE — 86850 RBC ANTIBODY SCREEN: CPT | Mod: ORL | Performed by: OBSTETRICS & GYNECOLOGY

## 2025-01-17 PROCEDURE — 86900 BLOOD TYPING SEROLOGIC ABO: CPT | Mod: ORL | Performed by: OBSTETRICS & GYNECOLOGY

## 2025-01-17 PROCEDURE — 87340 HEPATITIS B SURFACE AG IA: CPT | Mod: ORL | Performed by: OBSTETRICS & GYNECOLOGY

## 2025-01-17 PROCEDURE — 86592 SYPHILIS TEST NON-TREP QUAL: CPT | Mod: ORL | Performed by: OBSTETRICS & GYNECOLOGY

## 2025-01-17 PROCEDURE — 83036 HEMOGLOBIN GLYCOSYLATED A1C: CPT | Mod: ORL | Performed by: OBSTETRICS & GYNECOLOGY

## 2025-01-17 PROCEDURE — 87389 HIV-1 AG W/HIV-1&-2 AB AG IA: CPT | Mod: ORL | Performed by: OBSTETRICS & GYNECOLOGY

## 2025-01-17 PROCEDURE — 85025 COMPLETE CBC W/AUTO DIFF WBC: CPT | Mod: ORL | Performed by: OBSTETRICS & GYNECOLOGY

## 2025-01-17 PROCEDURE — 86762 RUBELLA ANTIBODY: CPT | Mod: ORL | Performed by: OBSTETRICS & GYNECOLOGY

## 2025-01-18 LAB
ABO + RH BLD: NORMAL
BLD GP AB SCN SERPL QL: NEGATIVE
HBV SURFACE AG SERPL QL IA: NONREACTIVE
HCV AB SERPL QL IA: NONREACTIVE
SPECIMEN EXP DATE BLD: NORMAL

## 2025-01-20 LAB
RPR SER QL: NONREACTIVE
RUBV IGG SERPL QL IA: 1.28 INDEX
RUBV IGG SERPL QL IA: POSITIVE